# Patient Record
Sex: MALE | Race: BLACK OR AFRICAN AMERICAN | NOT HISPANIC OR LATINO | Employment: OTHER | URBAN - METROPOLITAN AREA
[De-identification: names, ages, dates, MRNs, and addresses within clinical notes are randomized per-mention and may not be internally consistent; named-entity substitution may affect disease eponyms.]

---

## 2024-04-03 ENCOUNTER — APPOINTMENT (OUTPATIENT)
Dept: RADIOLOGY | Facility: HOSPITAL | Age: 69
End: 2024-04-03
Payer: MEDICAID

## 2024-04-03 ENCOUNTER — HOSPITAL ENCOUNTER (EMERGENCY)
Facility: HOSPITAL | Age: 69
Discharge: NURSING, REHAB OR LONG-TERM CARE | End: 2024-04-03
Attending: EMERGENCY MEDICINE
Payer: MEDICAID

## 2024-04-03 VITALS
SYSTOLIC BLOOD PRESSURE: 156 MMHG | BODY MASS INDEX: 21 KG/M2 | HEART RATE: 92 BPM | DIASTOLIC BLOOD PRESSURE: 76 MMHG | RESPIRATION RATE: 20 BRPM | WEIGHT: 150 LBS | OXYGEN SATURATION: 100 % | TEMPERATURE: 97.9 F | HEIGHT: 71 IN

## 2024-04-03 DIAGNOSIS — S09.90XA CLOSED HEAD INJURY, INITIAL ENCOUNTER: Primary | ICD-10-CM

## 2024-04-03 PROCEDURE — 72125 CT NECK SPINE W/O DYE: CPT | Performed by: RADIOLOGY

## 2024-04-03 PROCEDURE — 70450 CT HEAD/BRAIN W/O DYE: CPT | Performed by: RADIOLOGY

## 2024-04-03 PROCEDURE — G0390 TRAUMA RESPONS W/HOSP CRITI: HCPCS

## 2024-04-03 PROCEDURE — 36415 COLL VENOUS BLD VENIPUNCTURE: CPT | Performed by: NURSE PRACTITIONER

## 2024-04-03 PROCEDURE — 72125 CT NECK SPINE W/O DYE: CPT

## 2024-04-03 PROCEDURE — 99285 EMERGENCY DEPT VISIT HI MDM: CPT | Mod: 25

## 2024-04-03 PROCEDURE — 70450 CT HEAD/BRAIN W/O DYE: CPT

## 2024-04-03 ASSESSMENT — LIFESTYLE VARIABLES
HAVE PEOPLE ANNOYED YOU BY CRITICIZING YOUR DRINKING: NO
EVER HAD A DRINK FIRST THING IN THE MORNING TO STEADY YOUR NERVES TO GET RID OF A HANGOVER: NO
EVER FELT BAD OR GUILTY ABOUT YOUR DRINKING: NO
TOTAL SCORE: 0
HAVE YOU EVER FELT YOU SHOULD CUT DOWN ON YOUR DRINKING: NO

## 2024-04-03 ASSESSMENT — PAIN - FUNCTIONAL ASSESSMENT: PAIN_FUNCTIONAL_ASSESSMENT: 0-10

## 2024-04-03 ASSESSMENT — PAIN DESCRIPTION - PROGRESSION: CLINICAL_PROGRESSION: NOT CHANGED

## 2024-04-03 ASSESSMENT — PAIN SCALES - GENERAL
PAINLEVEL_OUTOF10: 0 - NO PAIN
PAINLEVEL_OUTOF10: 0 - NO PAIN

## 2024-04-03 NOTE — ED PROVIDER NOTES
Limitations to History: None     HPI:      Gabino Petersen is a 68 y.o. -American male with liver cancer on hospice who is a DNR CC on [facility] residing at [facility] presenting to ED today via EMS for evaluation after a fall.  Patient was found on the floor under his bed per [facility] staff.  Staff reports they had seen the patient 3 minutes prior.  Limited details.  Oriented to name only at baseline.  C-collar per EMS.  Unable to complete review of systems due to mental status.  Unknown social history.  PCP is Dr. Ambrose.     Additional History Obtained from: [facility] paperwork.    ------------------------------------------------------------------------------------------------------------------------------------------    VS: As documented in the triage note and EMR flowsheet from this visit were reviewed.    Physical Exam:  Gen: Older -American male, appears older than his stated age.  Awake and alert.  Oriented to name only.  Thin with muscle wasting.  Lips slightly dry.  Head/Neck: NCAT, neck w/ FROM  Eyes: EOMI, PERRL, anicteric sclerae, noninjected conjunctivae  Ears: TMs clear b/l without sign of infection  Nose: Nares patent w/o rhinorrhea  Mouth:  MMM, no OP lesions noted  Heart: RRR no MRG  Lungs: CTA b/l no RRW, no increased work of breathing  Abdomen: Abdomen rounded and soft, liver border palpable, bowel sounds x 4.  Mildly generally tender.  No CVA tenderness.  Musculoskeletal: No midline C-spine, T-spine, L-spine tenderness, no step-off.  C-collar in place.  Pelvis stable.  Spontaneous movement of extremities.  MSPs intact.  Skin intact.  Amputation toes left foot.  Neurologic: Alert, symmetrical facies, phonates clearly, moves all extremities equally, responsive to touch, ambulates normally   Skin: Pink warm and dry.  No rashes noted  Psychological: calm, no  SI/HI      ------------------------------------------------------------------------------------------------------------------------------------------    Medical Decision Makin-year-old male with known liver cancer on Eliquis who is a DNR CC on hospice at [facility] care is evaluated at the bedside after an unwitnessed fall.  Patient was found under his bed at the facility.  Staff states they have seen him 3 minutes prior and he was in bed.  Unsure if the patient hit his head.  C-collar placed and the patient was transported to the ER.  On arrival to the ED, patient is awake, he is alert and oriented to name only which is his baseline.  Vital signs within normal limits.  Afebrile.  No midline spinal tenderness.  Due to the patient's anticoagulation, HIA was called on arrival.  CT head/C-spine will be performed.  Anticipate transfer back to SNF.    ED Course as of 24 0801      0757 CT C-spine shows no acute fractures or subluxations, degenerative changes.  Head CT shows no acute intracranial process.  Stand down on HIA at 0753, c-collar removed.  Repeat vital signs within normal limits.  Continues to mentate at baseline oriented to 1.  Discharge back to SNF.  Resume normal medications.  Diagnosis, treatment and plan for discharge discussed with SNF staff per nursing.  Follow-up with PCP at SNF.  Condition stable for discharge. [SB]      ED Course User Index  [SB] POOJA Thomas-CNP       EKG interpreted by myself (ED attending physician): Not ordered    Chronic Medical Conditions Significantly Affecting Care: Dementia    External Records Reviewed: I reviewed recent and relevant outside records including: SNF paperwork    Discussion of Management with Other Providers: Seen and evaluated with ED attending physician, Dr. Loya, she agrees with the treatment plan and final disposition of the patient.    I discussed the patient/results with: None       POOJA Thomas-CNP  24  0803

## 2024-04-03 NOTE — DISCHARGE INSTRUCTIONS
CT head and C-spine were normal.  Patient is mentating at baseline.  Vital signs within normal limits.  Follow-up with PCP at SNF in the next 2 to 3 days.  Resume normal medications.